# Patient Record
Sex: FEMALE | Race: WHITE | HISPANIC OR LATINO | Employment: UNEMPLOYED | URBAN - METROPOLITAN AREA
[De-identification: names, ages, dates, MRNs, and addresses within clinical notes are randomized per-mention and may not be internally consistent; named-entity substitution may affect disease eponyms.]

---

## 2021-04-07 ENCOUNTER — HOSPITAL ENCOUNTER (EMERGENCY)
Facility: HOSPITAL | Age: 26
Discharge: HOME | End: 2021-04-07
Attending: EMERGENCY MEDICINE

## 2021-04-07 VITALS
SYSTOLIC BLOOD PRESSURE: 108 MMHG | BODY MASS INDEX: 18.84 KG/M2 | RESPIRATION RATE: 16 BRPM | WEIGHT: 113.19 LBS | OXYGEN SATURATION: 99 % | TEMPERATURE: 97 F | DIASTOLIC BLOOD PRESSURE: 65 MMHG | HEART RATE: 75 BPM

## 2021-04-07 DIAGNOSIS — R53.83 FATIGUE, UNSPECIFIED TYPE: ICD-10-CM

## 2021-04-07 DIAGNOSIS — Z34.90 INTRAUTERINE PREGNANCY: Primary | ICD-10-CM

## 2021-04-07 DIAGNOSIS — R00.2 PALPITATIONS: ICD-10-CM

## 2021-04-07 LAB
ALBUMIN SERPL BCP-MCNC: 3.8 G/DL (ref 3.5–5.2)
ALP SERPL-CCNC: 51 U/L (ref 55–135)
ALT SERPL W/O P-5'-P-CCNC: 12 U/L (ref 10–44)
ANION GAP SERPL CALC-SCNC: 7 MMOL/L (ref 8–16)
AST SERPL-CCNC: 12 U/L (ref 10–40)
B-HCG UR QL: POSITIVE
BASOPHILS # BLD AUTO: 0.02 K/UL (ref 0–0.2)
BASOPHILS NFR BLD: 0.3 % (ref 0–1.9)
BILIRUB SERPL-MCNC: 0.9 MG/DL (ref 0.1–1)
BILIRUB UR QL STRIP: NEGATIVE
BUN SERPL-MCNC: 10 MG/DL (ref 6–20)
CALCIUM SERPL-MCNC: 8.7 MG/DL (ref 8.7–10.5)
CHLORIDE SERPL-SCNC: 105 MMOL/L (ref 95–110)
CLARITY UR: CLEAR
CO2 SERPL-SCNC: 24 MMOL/L (ref 23–29)
COLOR UR: YELLOW
CREAT SERPL-MCNC: 0.7 MG/DL (ref 0.5–1.4)
DIFFERENTIAL METHOD: ABNORMAL
EOSINOPHIL # BLD AUTO: 0.1 K/UL (ref 0–0.5)
EOSINOPHIL NFR BLD: 0.8 % (ref 0–8)
ERYTHROCYTE [DISTWIDTH] IN BLOOD BY AUTOMATED COUNT: 15.1 % (ref 11.5–14.5)
EST. GFR  (AFRICAN AMERICAN): >60 ML/MIN/1.73 M^2
EST. GFR  (NON AFRICAN AMERICAN): >60 ML/MIN/1.73 M^2
GLUCOSE SERPL-MCNC: 110 MG/DL (ref 70–110)
GLUCOSE UR QL STRIP: NEGATIVE
HCG INTACT+B SERPL-ACNC: NORMAL MIU/ML
HCT VFR BLD AUTO: 33.3 % (ref 37–48.5)
HGB BLD-MCNC: 10.8 G/DL (ref 12–16)
HGB UR QL STRIP: NEGATIVE
IMM GRANULOCYTES # BLD AUTO: 0.01 K/UL (ref 0–0.04)
IMM GRANULOCYTES NFR BLD AUTO: 0.2 % (ref 0–0.5)
KETONES UR QL STRIP: NEGATIVE
LEUKOCYTE ESTERASE UR QL STRIP: NEGATIVE
LYMPHOCYTES # BLD AUTO: 1.3 K/UL (ref 1–4.8)
LYMPHOCYTES NFR BLD: 21 % (ref 18–48)
MCH RBC QN AUTO: 28.3 PG (ref 27–31)
MCHC RBC AUTO-ENTMCNC: 32.4 G/DL (ref 32–36)
MCV RBC AUTO: 87 FL (ref 82–98)
MONOCYTES # BLD AUTO: 0.4 K/UL (ref 0.3–1)
MONOCYTES NFR BLD: 6.4 % (ref 4–15)
NEUTROPHILS # BLD AUTO: 4.3 K/UL (ref 1.8–7.7)
NEUTROPHILS NFR BLD: 71.3 % (ref 38–73)
NITRITE UR QL STRIP: NEGATIVE
NRBC BLD-RTO: 0 /100 WBC
PH UR STRIP: 7 [PH] (ref 5–8)
PLATELET # BLD AUTO: 199 K/UL (ref 150–450)
PMV BLD AUTO: 10.4 FL (ref 9.2–12.9)
POTASSIUM SERPL-SCNC: 3.6 MMOL/L (ref 3.5–5.1)
PROT SERPL-MCNC: 6.4 G/DL (ref 6–8.4)
PROT UR QL STRIP: NEGATIVE
RBC # BLD AUTO: 3.82 M/UL (ref 4–5.4)
SODIUM SERPL-SCNC: 136 MMOL/L (ref 136–145)
SP GR UR STRIP: 1.01 (ref 1–1.03)
T4 FREE SERPL-MCNC: 1.2 NG/DL (ref 0.71–1.51)
TROPONIN I SERPL DL<=0.01 NG/ML-MCNC: <0.006 NG/ML (ref 0–0.03)
TSH SERPL DL<=0.005 MIU/L-ACNC: 0.34 UIU/ML (ref 0.4–4)
URN SPEC COLLECT METH UR: NORMAL
UROBILINOGEN UR STRIP-ACNC: NEGATIVE EU/DL
WBC # BLD AUTO: 6.09 K/UL (ref 3.9–12.7)

## 2021-04-07 PROCEDURE — 93010 ELECTROCARDIOGRAM REPORT: CPT | Mod: ,,, | Performed by: INTERNAL MEDICINE

## 2021-04-07 PROCEDURE — 25000003 PHARM REV CODE 250: Performed by: NURSE PRACTITIONER

## 2021-04-07 PROCEDURE — 84439 ASSAY OF FREE THYROXINE: CPT | Performed by: NURSE PRACTITIONER

## 2021-04-07 PROCEDURE — 81025 URINE PREGNANCY TEST: CPT | Performed by: NURSE PRACTITIONER

## 2021-04-07 PROCEDURE — 84443 ASSAY THYROID STIM HORMONE: CPT | Performed by: NURSE PRACTITIONER

## 2021-04-07 PROCEDURE — 96360 HYDRATION IV INFUSION INIT: CPT

## 2021-04-07 PROCEDURE — 93010 EKG 12-LEAD: ICD-10-PCS | Mod: ,,, | Performed by: INTERNAL MEDICINE

## 2021-04-07 PROCEDURE — 99285 EMERGENCY DEPT VISIT HI MDM: CPT | Mod: 25

## 2021-04-07 PROCEDURE — 36415 COLL VENOUS BLD VENIPUNCTURE: CPT | Performed by: NURSE PRACTITIONER

## 2021-04-07 PROCEDURE — 81003 URINALYSIS AUTO W/O SCOPE: CPT | Performed by: NURSE PRACTITIONER

## 2021-04-07 PROCEDURE — 84484 ASSAY OF TROPONIN QUANT: CPT | Performed by: NURSE PRACTITIONER

## 2021-04-07 PROCEDURE — 84702 CHORIONIC GONADOTROPIN TEST: CPT | Performed by: NURSE PRACTITIONER

## 2021-04-07 PROCEDURE — 80053 COMPREHEN METABOLIC PANEL: CPT | Performed by: NURSE PRACTITIONER

## 2021-04-07 PROCEDURE — 85025 COMPLETE CBC W/AUTO DIFF WBC: CPT | Performed by: NURSE PRACTITIONER

## 2021-04-07 PROCEDURE — 93005 ELECTROCARDIOGRAM TRACING: CPT

## 2021-04-07 RX ORDER — SODIUM CHLORIDE 9 MG/ML
INJECTION, SOLUTION INTRAVENOUS
Status: COMPLETED | OUTPATIENT
Start: 2021-04-07 | End: 2021-04-07

## 2021-04-07 RX ADMIN — SODIUM CHLORIDE: 0.9 INJECTION, SOLUTION INTRAVENOUS at 01:04

## 2024-07-28 ENCOUNTER — HOSPITAL ENCOUNTER (EMERGENCY)
Facility: HOSPITAL | Age: 29
Discharge: HOME | End: 2024-07-28
Attending: SURGERY

## 2024-07-28 VITALS
TEMPERATURE: 98 F | RESPIRATION RATE: 20 BRPM | HEIGHT: 65 IN | WEIGHT: 115 LBS | OXYGEN SATURATION: 98 % | HEART RATE: 60 BPM | BODY MASS INDEX: 19.16 KG/M2 | SYSTOLIC BLOOD PRESSURE: 122 MMHG | DIASTOLIC BLOOD PRESSURE: 71 MMHG

## 2024-07-28 DIAGNOSIS — R07.89 CHEST TIGHTNESS: ICD-10-CM

## 2024-07-28 DIAGNOSIS — A08.4 VIRAL GASTROENTERITIS: Primary | ICD-10-CM

## 2024-07-28 DIAGNOSIS — E87.6 HYPOKALEMIA: ICD-10-CM

## 2024-07-28 LAB
ALBUMIN SERPL BCP-MCNC: 4.7 G/DL (ref 3.5–5.2)
ALP SERPL-CCNC: 84 U/L (ref 55–135)
ALT SERPL W/O P-5'-P-CCNC: 20 U/L (ref 10–44)
ANION GAP SERPL CALC-SCNC: 16 MMOL/L (ref 8–16)
AST SERPL-CCNC: 21 U/L (ref 10–40)
B-HCG UR QL: NEGATIVE
BACTERIA #/AREA URNS HPF: ABNORMAL /HPF
BASOPHILS # BLD AUTO: 0.01 K/UL (ref 0–0.2)
BASOPHILS NFR BLD: 0.1 % (ref 0–1.9)
BILIRUB SERPL-MCNC: 1.2 MG/DL (ref 0.1–1)
BILIRUB UR QL STRIP: ABNORMAL
BUN SERPL-MCNC: 12 MG/DL (ref 6–20)
CALCIUM SERPL-MCNC: 10.2 MG/DL (ref 8.7–10.5)
CHLORIDE SERPL-SCNC: 105 MMOL/L (ref 95–110)
CLARITY UR: CLEAR
CO2 SERPL-SCNC: 18 MMOL/L (ref 23–29)
COLOR UR: YELLOW
CREAT SERPL-MCNC: 0.8 MG/DL (ref 0.5–1.4)
DIFFERENTIAL METHOD BLD: ABNORMAL
EOSINOPHIL # BLD AUTO: 0 K/UL (ref 0–0.5)
EOSINOPHIL NFR BLD: 0.3 % (ref 0–8)
ERYTHROCYTE [DISTWIDTH] IN BLOOD BY AUTOMATED COUNT: 12.6 % (ref 11.5–14.5)
EST. GFR  (NO RACE VARIABLE): >60 ML/MIN/1.73 M^2
GLUCOSE SERPL-MCNC: 109 MG/DL (ref 70–110)
GLUCOSE UR QL STRIP: NEGATIVE
HCT VFR BLD AUTO: 40.8 % (ref 37–48.5)
HGB BLD-MCNC: 13.8 G/DL (ref 12–16)
HGB UR QL STRIP: ABNORMAL
HYALINE CASTS #/AREA URNS LPF: 0 /LPF
IMM GRANULOCYTES # BLD AUTO: 0.02 K/UL (ref 0–0.04)
IMM GRANULOCYTES NFR BLD AUTO: 0.3 % (ref 0–0.5)
KETONES UR QL STRIP: ABNORMAL
LEUKOCYTE ESTERASE UR QL STRIP: NEGATIVE
LIPASE SERPL-CCNC: 19 U/L (ref 4–60)
LYMPHOCYTES # BLD AUTO: 0.8 K/UL (ref 1–4.8)
LYMPHOCYTES NFR BLD: 10.2 % (ref 18–48)
MCH RBC QN AUTO: 29 PG (ref 27–31)
MCHC RBC AUTO-ENTMCNC: 33.8 G/DL (ref 32–36)
MCV RBC AUTO: 86 FL (ref 82–98)
MICROSCOPIC COMMENT: ABNORMAL
MONOCYTES # BLD AUTO: 0.4 K/UL (ref 0.3–1)
MONOCYTES NFR BLD: 5.4 % (ref 4–15)
NEUTROPHILS # BLD AUTO: 6.5 K/UL (ref 1.8–7.7)
NEUTROPHILS NFR BLD: 83.7 % (ref 38–73)
NITRITE UR QL STRIP: NEGATIVE
NRBC BLD-RTO: 0 /100 WBC
PH UR STRIP: 7 [PH] (ref 5–8)
PLATELET # BLD AUTO: 215 K/UL (ref 150–450)
PMV BLD AUTO: 10.7 FL (ref 9.2–12.9)
POTASSIUM SERPL-SCNC: 3.4 MMOL/L (ref 3.5–5.1)
PROT SERPL-MCNC: 8.1 G/DL (ref 6–8.4)
PROT UR QL STRIP: ABNORMAL
RBC # BLD AUTO: 4.76 M/UL (ref 4–5.4)
RBC #/AREA URNS HPF: 12 /HPF (ref 0–4)
SODIUM SERPL-SCNC: 139 MMOL/L (ref 136–145)
SP GR UR STRIP: 1.02 (ref 1–1.03)
TROPONIN I SERPL DL<=0.01 NG/ML-MCNC: <0.006 NG/ML (ref 0–0.03)
URN SPEC COLLECT METH UR: ABNORMAL
UROBILINOGEN UR STRIP-ACNC: NEGATIVE EU/DL
WBC # BLD AUTO: 7.78 K/UL (ref 3.9–12.7)
WBC #/AREA URNS HPF: 0 /HPF (ref 0–5)

## 2024-07-28 PROCEDURE — 63600175 PHARM REV CODE 636 W HCPCS

## 2024-07-28 PROCEDURE — 99284 EMERGENCY DEPT VISIT MOD MDM: CPT | Mod: 25

## 2024-07-28 PROCEDURE — 83690 ASSAY OF LIPASE: CPT

## 2024-07-28 PROCEDURE — 80053 COMPREHEN METABOLIC PANEL: CPT

## 2024-07-28 PROCEDURE — 96375 TX/PRO/DX INJ NEW DRUG ADDON: CPT

## 2024-07-28 PROCEDURE — 96361 HYDRATE IV INFUSION ADD-ON: CPT

## 2024-07-28 PROCEDURE — 84484 ASSAY OF TROPONIN QUANT: CPT

## 2024-07-28 PROCEDURE — 93005 ELECTROCARDIOGRAM TRACING: CPT

## 2024-07-28 PROCEDURE — 85025 COMPLETE CBC W/AUTO DIFF WBC: CPT

## 2024-07-28 PROCEDURE — 81000 URINALYSIS NONAUTO W/SCOPE: CPT

## 2024-07-28 PROCEDURE — 96365 THER/PROPH/DIAG IV INF INIT: CPT

## 2024-07-28 PROCEDURE — 96376 TX/PRO/DX INJ SAME DRUG ADON: CPT

## 2024-07-28 PROCEDURE — 25000003 PHARM REV CODE 250

## 2024-07-28 PROCEDURE — 93010 ELECTROCARDIOGRAM REPORT: CPT | Mod: ,,, | Performed by: INTERNAL MEDICINE

## 2024-07-28 PROCEDURE — 81025 URINE PREGNANCY TEST: CPT

## 2024-07-28 RX ORDER — PROMETHAZINE HYDROCHLORIDE 25 MG/1
25 TABLET ORAL EVERY 6 HOURS PRN
Qty: 10 TABLET | Refills: 0 | OUTPATIENT
Start: 2024-07-28 | End: 2024-07-29

## 2024-07-28 RX ORDER — PANTOPRAZOLE SODIUM 40 MG/10ML
40 INJECTION, POWDER, LYOPHILIZED, FOR SOLUTION INTRAVENOUS
Status: COMPLETED | OUTPATIENT
Start: 2024-07-28 | End: 2024-07-28

## 2024-07-28 RX ORDER — ONDANSETRON HYDROCHLORIDE 2 MG/ML
4 INJECTION, SOLUTION INTRAVENOUS
Status: COMPLETED | OUTPATIENT
Start: 2024-07-28 | End: 2024-07-28

## 2024-07-28 RX ORDER — ONDANSETRON 4 MG/1
4 TABLET, ORALLY DISINTEGRATING ORAL EVERY 8 HOURS PRN
Qty: 15 TABLET | Refills: 0 | Status: SHIPPED | OUTPATIENT
Start: 2024-07-28

## 2024-07-28 RX ORDER — DICYCLOMINE HYDROCHLORIDE 20 MG/1
20 TABLET ORAL 2 TIMES DAILY PRN
Qty: 10 TABLET | Refills: 0 | Status: SHIPPED | OUTPATIENT
Start: 2024-07-28 | End: 2024-08-02

## 2024-07-28 RX ORDER — LORAZEPAM 2 MG/ML
1 INJECTION INTRAMUSCULAR
Status: COMPLETED | OUTPATIENT
Start: 2024-07-28 | End: 2024-07-28

## 2024-07-28 RX ADMIN — PROMETHAZINE HYDROCHLORIDE 25 MG: 25 INJECTION INTRAMUSCULAR; INTRAVENOUS at 11:07

## 2024-07-28 RX ADMIN — SODIUM CHLORIDE 1000 ML: 9 INJECTION, SOLUTION INTRAVENOUS at 11:07

## 2024-07-28 RX ADMIN — ONDANSETRON 4 MG: 2 INJECTION, SOLUTION INTRAMUSCULAR; INTRAVENOUS at 01:07

## 2024-07-28 RX ADMIN — PANTOPRAZOLE SODIUM 40 MG: 40 INJECTION, POWDER, FOR SOLUTION INTRAVENOUS at 11:07

## 2024-07-28 RX ADMIN — LORAZEPAM 1 MG: 2 INJECTION INTRAMUSCULAR at 01:07

## 2024-07-28 RX ADMIN — LORAZEPAM 1 MG: 2 INJECTION INTRAMUSCULAR; INTRAVENOUS at 12:07

## 2024-07-28 NOTE — DISCHARGE INSTRUCTIONS
Please take Bentyl to help control stomach cramping.  Please use Zofran and Phenergan as needed to help control nausea vomiting.  You may return to ER if you develop any worsening or concerning symptoms

## 2024-07-28 NOTE — ED PROVIDER NOTES
Encounter Date: 7/28/2024       History     Chief Complaint   Patient presents with    Vomiting    Diarrhea     This note is dictated on M*Modal word recognition program.  There are word recognition mistakes and grammatical errors that are occasionally missed on review.     Laila Pagan is a 29 y.o. female presents to ER today with complaints of lower abdominal cramping, vomiting and diarrhea since yesterday.  Patient reports chest tightness and worsening chest pain as she vomits.  Patient reports she does have a history of colitis in the past.  Patient reports the vomiting and chest pressure is making her very anxious at this time.  The more she vomits to heavy her chest gets.  Patient reports the feeling she was having make her very anxious at this time.  She feels like she is on the verge of a panic attack.    The history is provided by the patient.     Review of patient's allergies indicates:  No Known Allergies  No past medical history on file.  No past surgical history on file.  Family History   Problem Relation Name Age of Onset    Breast cancer Neg Hx      Colon cancer Neg Hx      Ovarian cancer Neg Hx       Social History     Tobacco Use    Smoking status: Former    Smokeless tobacco: Never   Substance Use Topics    Alcohol use: No    Drug use: No     Review of Systems   Respiratory:  Positive for chest tightness.    Gastrointestinal:  Positive for abdominal pain, diarrhea, nausea and vomiting.   Endocrine: Negative.    Musculoskeletal: Negative.    All other systems reviewed and are negative.      Physical Exam     Initial Vitals [07/28/24 1105]   BP Pulse Resp Temp SpO2   126/68 80 18 97.9 °F (36.6 °C) 100 %      MAP       --         Physical Exam    Constitutional: She appears well-developed and well-nourished. She is not diaphoretic.   HENT:   Head: Normocephalic.   Eyes: Pupils are equal, round, and reactive to light.   Neck: Neck supple.   Normal range of motion.  Cardiovascular:  Normal rate.            Abdominal: She exhibits no distension. There is no abdominal tenderness.   Patient is actively vomiting bile during interview. There is no rebound.   Musculoskeletal:         General: No tenderness or edema.      Cervical back: Normal range of motion and neck supple.     Neurological: She is alert and oriented to person, place, and time. GCS score is 15. GCS eye subscore is 4. GCS verbal subscore is 5. GCS motor subscore is 6.   Skin: Capillary refill takes less than 2 seconds.   Psychiatric: She has a normal mood and affect.         ED Course   Procedures  Labs Reviewed   CBC W/ AUTO DIFFERENTIAL - Abnormal       Result Value    WBC 7.78      RBC 4.76      Hemoglobin 13.8      Hematocrit 40.8      MCV 86      MCH 29.0      MCHC 33.8      RDW 12.6      Platelets 215      MPV 10.7      Immature Granulocytes 0.3      Gran # (ANC) 6.5      Immature Grans (Abs) 0.02      Lymph # 0.8 (*)     Mono # 0.4      Eos # 0.0      Baso # 0.01      nRBC 0      Gran % 83.7 (*)     Lymph % 10.2 (*)     Mono % 5.4      Eosinophil % 0.3      Basophil % 0.1      Differential Method Automated     COMPREHENSIVE METABOLIC PANEL - Abnormal    Sodium 139      Potassium 3.4 (*)     Chloride 105      CO2 18 (*)     Glucose 109      BUN 12      Creatinine 0.8      Calcium 10.2      Total Protein 8.1      Albumin 4.7      Total Bilirubin 1.2 (*)     Alkaline Phosphatase 84      AST 21      ALT 20      eGFR >60      Anion Gap 16     URINALYSIS, REFLEX TO URINE CULTURE - Abnormal    Specimen UA Urine, Clean Catch      Color, UA Yellow      Appearance, UA Clear      pH, UA 7.0      Specific Gravity, UA 1.025      Protein, UA 1+ (*)     Glucose, UA Negative      Ketones, UA 3+ (*)     Bilirubin (UA) 1+ (*)     Occult Blood UA 2+ (*)     Nitrite, UA Negative      Urobilinogen, UA Negative      Leukocytes, UA Negative      Narrative:     Specimen Source->Urine   URINALYSIS MICROSCOPIC - Abnormal    RBC, UA 12 (*)     WBC, UA 0      Bacteria Few  (*)     Hyaline Casts, UA 0      Microscopic Comment SEE COMMENT      Narrative:     Specimen Source->Urine   LIPASE    Lipase 19     PREGNANCY TEST, URINE RAPID    Preg Test, Ur Negative      Narrative:     Specimen Source->Urine   TROPONIN I    Troponin I <0.006       EKG Readings: (Independently Interpreted)   Initial Reading: No STEMI. Rhythm: Normal Sinus Rhythm. Heart Rate: 72. Ectopy: No Ectopy. Clinical Impression: Normal Sinus Rhythm       Imaging Results    None          Medications   sodium chloride 0.9% bolus 1,000 mL 1,000 mL (0 mLs Intravenous Stopped 7/28/24 1309)   promethazine (PHENERGAN) 25 mg in 0.9% NaCl 50 mL IVPB (0 mg Intravenous Stopped 7/28/24 1220)   pantoprazole injection 40 mg (40 mg Intravenous Given 7/28/24 1159)   LORazepam injection 1 mg (1 mg Intravenous Given 7/28/24 1226)   ondansetron injection 4 mg (4 mg Intravenous Given 7/28/24 1345)   LORazepam injection 1 mg (1 mg Intravenous Given 7/28/24 1345)     Medical Decision Making  Differential diagnosis includes but not limited to colitis, diverticulitis, viral gastroenteritis, food poisoning, esophagitis GERD, bilious vomiting    Patient's physical exam/laboratory findings consistent with viral gastroenteritis.  Patient had mild hypokalemia 3.4, 3.5 is normal.  Urinalysis did have blood however patient is on her menstrual cycle.  No infection noted from urinalysis.  Urine pregnancy test negative.  CBC reveals no leukocytosis no anemia.  Lipase within normal limits.    Troponin within normal limits.    EKG reveals normal sinus rhythm.  ACS felt less likely.    Patient also has been dealing with anxiety due to her symptoms.  Patient was treated with Ativan.  Patient's nausea vomiting was treated with both Zofran and Phenergan patient was given a L of normal saline.  Vital signs stable at discharge.  Patient stable at time of discharge in no acute distress.  No life-threatening illnesses were found during ER visit today.  Patient was  instructed to follow-up with PCP or other recommended specialist within the next 48-72 hours.  Patient was instructed to return to ER immediately for any worsening or concerning symptoms.  All discharge instructions discussed with patient, and patient agrees to comply with discharge instructions given today.       Amount and/or Complexity of Data Reviewed  Labs: ordered.    Risk  Prescription drug management.                                      Clinical Impression:  Final diagnoses:  [R07.89] Chest tightness  [A08.4] Viral gastroenteritis (Primary)  [E87.6] Hypokalemia          ED Disposition Condition    Discharge Stable          ED Prescriptions       Medication Sig Dispense Start Date End Date Auth. Provider    dicyclomine (BENTYL) 20 mg tablet Take 1 tablet (20 mg total) by mouth 2 (two) times daily as needed (Abdominal cramping). 10 tablet 7/28/2024 8/2/2024 Hosea Bonner, NICOLE    ondansetron (ZOFRAN-ODT) 4 MG TbDL Take 1 tablet (4 mg total) by mouth every 8 (eight) hours as needed (nausea/vomiting). 15 tablet 7/28/2024 -- Hosea Bonner NP    promethazine (PHENERGAN) 25 MG tablet Take 1 tablet (25 mg total) by mouth every 6 (six) hours as needed for Nausea (Take as 2nd choice to control nausea vomiting if Zofran ineffective.). 10 tablet 7/28/2024 -- Hosea Bonner NP          Follow-up Information    None          Hosea Bonner NP  07/28/24 5587

## 2024-07-29 ENCOUNTER — HOSPITAL ENCOUNTER (EMERGENCY)
Facility: HOSPITAL | Age: 29
Discharge: HOME | End: 2024-07-29
Attending: FAMILY MEDICINE

## 2024-07-29 VITALS
HEIGHT: 65 IN | OXYGEN SATURATION: 99 % | HEART RATE: 56 BPM | WEIGHT: 115 LBS | SYSTOLIC BLOOD PRESSURE: 140 MMHG | TEMPERATURE: 98 F | DIASTOLIC BLOOD PRESSURE: 79 MMHG | RESPIRATION RATE: 16 BRPM | BODY MASS INDEX: 19.16 KG/M2

## 2024-07-29 DIAGNOSIS — R07.89 CHEST TIGHTNESS: ICD-10-CM

## 2024-07-29 DIAGNOSIS — R10.9 ABDOMINAL PAIN: ICD-10-CM

## 2024-07-29 DIAGNOSIS — R11.2 NAUSEA AND VOMITING, UNSPECIFIED VOMITING TYPE: Primary | ICD-10-CM

## 2024-07-29 LAB
ALBUMIN SERPL BCP-MCNC: 4.2 G/DL (ref 3.5–5.2)
ALP SERPL-CCNC: 71 U/L (ref 55–135)
ALT SERPL W/O P-5'-P-CCNC: 24 U/L (ref 10–44)
ANION GAP SERPL CALC-SCNC: 14 MMOL/L (ref 8–16)
AST SERPL-CCNC: 20 U/L (ref 10–40)
BASOPHILS # BLD AUTO: 0.01 K/UL (ref 0–0.2)
BASOPHILS NFR BLD: 0.2 % (ref 0–1.9)
BILIRUB SERPL-MCNC: 0.5 MG/DL (ref 0.1–1)
BUN SERPL-MCNC: 14 MG/DL (ref 6–20)
CALCIUM SERPL-MCNC: 9.7 MG/DL (ref 8.7–10.5)
CHLORIDE SERPL-SCNC: 106 MMOL/L (ref 95–110)
CO2 SERPL-SCNC: 20 MMOL/L (ref 23–29)
CREAT SERPL-MCNC: 0.7 MG/DL (ref 0.5–1.4)
DIFFERENTIAL METHOD BLD: ABNORMAL
EOSINOPHIL # BLD AUTO: 0 K/UL (ref 0–0.5)
EOSINOPHIL NFR BLD: 0 % (ref 0–8)
ERYTHROCYTE [DISTWIDTH] IN BLOOD BY AUTOMATED COUNT: 12.9 % (ref 11.5–14.5)
EST. GFR  (NO RACE VARIABLE): >60 ML/MIN/1.73 M^2
GLUCOSE SERPL-MCNC: 108 MG/DL (ref 70–110)
HCT VFR BLD AUTO: 38.2 % (ref 37–48.5)
HGB BLD-MCNC: 12.7 G/DL (ref 12–16)
IMM GRANULOCYTES # BLD AUTO: 0.02 K/UL (ref 0–0.04)
IMM GRANULOCYTES NFR BLD AUTO: 0.3 % (ref 0–0.5)
LYMPHOCYTES # BLD AUTO: 1.1 K/UL (ref 1–4.8)
LYMPHOCYTES NFR BLD: 17.2 % (ref 18–48)
MAGNESIUM SERPL-MCNC: 1.9 MG/DL (ref 1.6–2.6)
MCH RBC QN AUTO: 28.8 PG (ref 27–31)
MCHC RBC AUTO-ENTMCNC: 33.2 G/DL (ref 32–36)
MCV RBC AUTO: 87 FL (ref 82–98)
MONOCYTES # BLD AUTO: 0.5 K/UL (ref 0.3–1)
MONOCYTES NFR BLD: 8.2 % (ref 4–15)
NEUTROPHILS # BLD AUTO: 4.7 K/UL (ref 1.8–7.7)
NEUTROPHILS NFR BLD: 74.1 % (ref 38–73)
NRBC BLD-RTO: 0 /100 WBC
PLATELET # BLD AUTO: 217 K/UL (ref 150–450)
PMV BLD AUTO: 10.9 FL (ref 9.2–12.9)
POTASSIUM SERPL-SCNC: 3.8 MMOL/L (ref 3.5–5.1)
PROT SERPL-MCNC: 7.5 G/DL (ref 6–8.4)
RBC # BLD AUTO: 4.41 M/UL (ref 4–5.4)
SODIUM SERPL-SCNC: 140 MMOL/L (ref 136–145)
TROPONIN I SERPL DL<=0.01 NG/ML-MCNC: <0.006 NG/ML (ref 0–0.03)
WBC # BLD AUTO: 6.33 K/UL (ref 3.9–12.7)

## 2024-07-29 PROCEDURE — 96374 THER/PROPH/DIAG INJ IV PUSH: CPT

## 2024-07-29 PROCEDURE — 99285 EMERGENCY DEPT VISIT HI MDM: CPT | Mod: 25

## 2024-07-29 PROCEDURE — 63600175 PHARM REV CODE 636 W HCPCS: Performed by: NURSE PRACTITIONER

## 2024-07-29 PROCEDURE — 99900035 HC TECH TIME PER 15 MIN (STAT)

## 2024-07-29 PROCEDURE — 96376 TX/PRO/DX INJ SAME DRUG ADON: CPT

## 2024-07-29 PROCEDURE — 96361 HYDRATE IV INFUSION ADD-ON: CPT

## 2024-07-29 PROCEDURE — 93010 ELECTROCARDIOGRAM REPORT: CPT | Mod: ,,, | Performed by: INTERNAL MEDICINE

## 2024-07-29 PROCEDURE — 83735 ASSAY OF MAGNESIUM: CPT | Performed by: NURSE PRACTITIONER

## 2024-07-29 PROCEDURE — 80053 COMPREHEN METABOLIC PANEL: CPT | Performed by: NURSE PRACTITIONER

## 2024-07-29 PROCEDURE — 84484 ASSAY OF TROPONIN QUANT: CPT | Performed by: NURSE PRACTITIONER

## 2024-07-29 PROCEDURE — 85025 COMPLETE CBC W/AUTO DIFF WBC: CPT | Performed by: NURSE PRACTITIONER

## 2024-07-29 PROCEDURE — 93005 ELECTROCARDIOGRAM TRACING: CPT

## 2024-07-29 PROCEDURE — 25000003 PHARM REV CODE 250: Performed by: NURSE PRACTITIONER

## 2024-07-29 PROCEDURE — 96375 TX/PRO/DX INJ NEW DRUG ADDON: CPT

## 2024-07-29 RX ORDER — LORAZEPAM 2 MG/ML
0.5 INJECTION INTRAMUSCULAR
Status: COMPLETED | OUTPATIENT
Start: 2024-07-29 | End: 2024-07-29

## 2024-07-29 RX ORDER — PROMETHAZINE HYDROCHLORIDE 25 MG/1
25 SUPPOSITORY RECTAL EVERY 6 HOURS PRN
Qty: 10 SUPPOSITORY | Refills: 0 | Status: SHIPPED | OUTPATIENT
Start: 2024-07-29

## 2024-07-29 RX ORDER — LORAZEPAM 0.5 MG/1
0.5 TABLET ORAL
Status: DISCONTINUED | OUTPATIENT
Start: 2024-07-29 | End: 2024-07-29

## 2024-07-29 RX ORDER — ONDANSETRON HYDROCHLORIDE 2 MG/ML
4 INJECTION, SOLUTION INTRAVENOUS
Status: COMPLETED | OUTPATIENT
Start: 2024-07-29 | End: 2024-07-29

## 2024-07-29 RX ADMIN — LORAZEPAM 0.5 MG: 2 INJECTION INTRAMUSCULAR; INTRAVENOUS at 10:07

## 2024-07-29 RX ADMIN — LORAZEPAM 0.5 MG: 2 INJECTION INTRAMUSCULAR; INTRAVENOUS at 12:07

## 2024-07-29 RX ADMIN — SODIUM CHLORIDE 1000 ML: 0.9 INJECTION, SOLUTION INTRAVENOUS at 10:07

## 2024-07-29 RX ADMIN — ONDANSETRON 4 MG: 2 INJECTION INTRAMUSCULAR; INTRAVENOUS at 10:07

## 2024-07-29 NOTE — ED PROVIDER NOTES
Encounter Date: 7/29/2024       History     Chief Complaint   Patient presents with    Vomiting     Chief complaint: Vomiting, anxiety  29-year-old female with no significant medical history presents to be evaluated for nausea and vomiting for the last 2 days.  She was seen in our ED 1 day prior with no concerning findings and was discharged for gastroenteritis.  She reports she has had 2-3 episodes of nonbilious nonbloody vomiting since being discharge.  She isn't able to hold down food or fluids.  She reports generalized abdominal cramping.  Also reports some chest tightness and anxiety.  Reports that the more she vomits more anxious she gets which is increasing her chest pain.  Denies any diarrhea constipation.  Denies any fever body aches or chills.  Denies any suspicious foods or recent ill contacts.      Review of patient's allergies indicates:  No Known Allergies  History reviewed. No pertinent past medical history.  History reviewed. No pertinent surgical history.  Family History   Problem Relation Name Age of Onset    Breast cancer Neg Hx      Colon cancer Neg Hx      Ovarian cancer Neg Hx       Social History     Tobacco Use    Smoking status: Former    Smokeless tobacco: Never   Substance Use Topics    Alcohol use: No    Drug use: No     Review of Systems   Constitutional:  Negative for fatigue and fever.   HENT:  Negative for congestion, sinus pain and sore throat.    Respiratory:  Positive for chest tightness. Negative for shortness of breath and wheezing.    Cardiovascular:  Negative for chest pain.   Gastrointestinal:  Positive for abdominal pain, nausea and vomiting. Negative for constipation and diarrhea.   Musculoskeletal:  Positive for myalgias.   Neurological:  Positive for weakness.       Physical Exam     Initial Vitals [07/29/24 0929]   BP Pulse Resp Temp SpO2   136/79 (!) 58 18 98.3 °F (36.8 °C) 99 %      MAP       --         Physical Exam    Nursing note and vitals reviewed.  Constitutional:  She appears well-developed and well-nourished.   HENT:   Head: Normocephalic and atraumatic.   Cardiovascular:  Normal rate and regular rhythm.           Pulmonary/Chest: Breath sounds normal. She has no wheezes. She has no rhonchi. She has no rales.   Abdominal: Abdomen is soft. She exhibits no distension and no mass. There is abdominal tenderness.   Epigastric tenderness There is no rebound and no guarding.           ED Course   Procedures  Labs Reviewed   CBC W/ AUTO DIFFERENTIAL - Abnormal       Result Value    WBC 6.33      RBC 4.41      Hemoglobin 12.7      Hematocrit 38.2      MCV 87      MCH 28.8      MCHC 33.2      RDW 12.9      Platelets 217      MPV 10.9      Immature Granulocytes 0.3      Gran # (ANC) 4.7      Immature Grans (Abs) 0.02      Lymph # 1.1      Mono # 0.5      Eos # 0.0      Baso # 0.01      nRBC 0      Gran % 74.1 (*)     Lymph % 17.2 (*)     Mono % 8.2      Eosinophil % 0.0      Basophil % 0.2      Differential Method Automated     COMPREHENSIVE METABOLIC PANEL - Abnormal    Sodium 140      Potassium 3.8      Chloride 106      CO2 20 (*)     Glucose 108      BUN 14      Creatinine 0.7      Calcium 9.7      Total Protein 7.5      Albumin 4.2      Total Bilirubin 0.5      Alkaline Phosphatase 71      AST 20      ALT 24      eGFR >60      Anion Gap 14     MAGNESIUM    Magnesium 1.9     TROPONIN I    Troponin I <0.006     URINALYSIS, REFLEX TO URINE CULTURE          Imaging Results              X-Ray Abdomen Flat And Erect (Final result)  Result time 07/29/24 10:50:52      Final result by Brennan Prince MD (07/29/24 10:50:52)                   Impression:      No acute process.      Electronically signed by: Brennan Prince MD  Date:    07/29/2024  Time:    10:50               Narrative:    EXAMINATION:  XR ABDOMEN FLAT AND ERECT    CLINICAL HISTORY:  Unspecified abdominal pain    TECHNIQUE:  Flat and erect AP views of the abdomen were  preformed.    COMPARISON:  08/24/2015    FINDINGS:  No dilated loops of small bowel.  No evidence of obstruction.  There are no suspicious calcifications.  IUD in within the pelvis osseous structures are within normal limits.   Lung bases are clear.                                       Medications   sodium chloride 0.9% bolus 1,000 mL 1,000 mL (0 mLs Intravenous Stopped 7/29/24 1101)   ondansetron injection 4 mg (4 mg Intravenous Given 7/29/24 1001)   LORazepam injection 0.5 mg (0.5 mg Intravenous Given 7/29/24 1022)   LORazepam injection 0.5 mg (0.5 mg Intravenous Given 7/29/24 1204)     Medical Decision Making  Th[age] year female presents to be evaluated for generalized abdominal pain and vomiting for the last 2 days.  Recently had a negative workup in our ED   Differential diagnoses include gastroenteritis, dehydration, electrolyte disturbance, anxiety    Amount and/or Complexity of Data Reviewed  Labs: ordered.     Details: CBC, CMP, troponin  Radiology: ordered.     Details: Flat and erect    Risk  Prescription drug management.  Risk Details: Patient with benign abdominal exam in ED today with no signs of acute abdomen  Patient had leukocytosis gross electrolyte derangement  Given IV fluids and antiemetics which was improving patient's symptoms  Flat and erect unremarkable   Vital signs stable   Patient is feeling somewhat improved since the incident stable for DC with follow-up with primary care   Given return precautions                                      Clinical Impression:  Final diagnoses:  [R07.89] Chest tightness  [R10.9] Abdominal pain  [R11.2] Nausea and vomiting, unspecified vomiting type (Primary)          ED Disposition Condition    Discharge Stable          ED Prescriptions       Medication Sig Dispense Start Date End Date Auth. Provider    promethazine (PHENERGAN) 25 MG suppository Place 1 suppository (25 mg total) rectally every 6 (six) hours as needed for Nausea. 10 suppository 7/29/2024 --  Ale Starkey, NICOLE          Follow-up Information    None          Ale Starkey, NICOLE  07/29/24 5589

## 2024-07-30 ENCOUNTER — HOSPITAL ENCOUNTER (EMERGENCY)
Facility: HOSPITAL | Age: 29
Discharge: HOME | End: 2024-07-30
Attending: EMERGENCY MEDICINE

## 2024-07-30 VITALS
HEART RATE: 62 BPM | BODY MASS INDEX: 17.99 KG/M2 | RESPIRATION RATE: 16 BRPM | SYSTOLIC BLOOD PRESSURE: 146 MMHG | HEIGHT: 65 IN | WEIGHT: 108 LBS | TEMPERATURE: 98 F | DIASTOLIC BLOOD PRESSURE: 82 MMHG | OXYGEN SATURATION: 100 %

## 2024-07-30 DIAGNOSIS — R10.13 EPIGASTRIC PAIN: ICD-10-CM

## 2024-07-30 DIAGNOSIS — K52.9 ENTERITIS: ICD-10-CM

## 2024-07-30 DIAGNOSIS — R11.10 VOMITING, UNSPECIFIED VOMITING TYPE, UNSPECIFIED WHETHER NAUSEA PRESENT: Primary | ICD-10-CM

## 2024-07-30 DIAGNOSIS — Z76.5 DRUG-SEEKING BEHAVIOR: ICD-10-CM

## 2024-07-30 LAB
ALBUMIN SERPL BCP-MCNC: 4.2 G/DL (ref 3.5–5.2)
ALP SERPL-CCNC: 65 U/L (ref 55–135)
ALT SERPL W/O P-5'-P-CCNC: 29 U/L (ref 10–44)
AMORPH CRY URNS QL MICRO: ABNORMAL
AMPHET+METHAMPHET UR QL: NEGATIVE
ANION GAP SERPL CALC-SCNC: 12 MMOL/L (ref 8–16)
AST SERPL-CCNC: 21 U/L (ref 10–40)
B-HCG UR QL: NEGATIVE
BACTERIA #/AREA URNS HPF: ABNORMAL /HPF
BARBITURATES UR QL SCN>200 NG/ML: NEGATIVE
BASOPHILS # BLD AUTO: 0.01 K/UL (ref 0–0.2)
BASOPHILS NFR BLD: 0.2 % (ref 0–1.9)
BENZODIAZ UR QL SCN>200 NG/ML: ABNORMAL
BILIRUB SERPL-MCNC: 0.7 MG/DL (ref 0.1–1)
BILIRUB UR QL STRIP: NEGATIVE
BUN SERPL-MCNC: 14 MG/DL (ref 6–20)
BZE UR QL SCN: NEGATIVE
CALCIUM SERPL-MCNC: 9.5 MG/DL (ref 8.7–10.5)
CANNABINOIDS UR QL SCN: ABNORMAL
CHLORIDE SERPL-SCNC: 104 MMOL/L (ref 95–110)
CLARITY UR: CLEAR
CO2 SERPL-SCNC: 22 MMOL/L (ref 23–29)
COLOR UR: YELLOW
CREAT SERPL-MCNC: 0.7 MG/DL (ref 0.5–1.4)
CREAT UR-MCNC: 64 MG/DL (ref 15–325)
DIFFERENTIAL METHOD BLD: NORMAL
EOSINOPHIL # BLD AUTO: 0 K/UL (ref 0–0.5)
EOSINOPHIL NFR BLD: 0.4 % (ref 0–8)
ERYTHROCYTE [DISTWIDTH] IN BLOOD BY AUTOMATED COUNT: 12.8 % (ref 11.5–14.5)
EST. GFR  (NO RACE VARIABLE): >60 ML/MIN/1.73 M^2
GLUCOSE SERPL-MCNC: 99 MG/DL (ref 70–110)
GLUCOSE UR QL STRIP: NEGATIVE
HCT VFR BLD AUTO: 38.5 % (ref 37–48.5)
HGB BLD-MCNC: 13.1 G/DL (ref 12–16)
HGB UR QL STRIP: ABNORMAL
HYALINE CASTS #/AREA URNS LPF: 2 /LPF
IMM GRANULOCYTES # BLD AUTO: 0.01 K/UL (ref 0–0.04)
IMM GRANULOCYTES NFR BLD AUTO: 0.2 % (ref 0–0.5)
KETONES UR QL STRIP: ABNORMAL
LEUKOCYTE ESTERASE UR QL STRIP: NEGATIVE
LIPASE SERPL-CCNC: 23 U/L (ref 4–60)
LYMPHOCYTES # BLD AUTO: 1.5 K/UL (ref 1–4.8)
LYMPHOCYTES NFR BLD: 28.4 % (ref 18–48)
MCH RBC QN AUTO: 29.8 PG (ref 27–31)
MCHC RBC AUTO-ENTMCNC: 34 G/DL (ref 32–36)
MCV RBC AUTO: 88 FL (ref 82–98)
METHADONE UR QL SCN>300 NG/ML: NEGATIVE
MICROSCOPIC COMMENT: ABNORMAL
MONOCYTES # BLD AUTO: 0.4 K/UL (ref 0.3–1)
MONOCYTES NFR BLD: 7.4 % (ref 4–15)
NEUTROPHILS # BLD AUTO: 3.3 K/UL (ref 1.8–7.7)
NEUTROPHILS NFR BLD: 63.4 % (ref 38–73)
NITRITE UR QL STRIP: NEGATIVE
NRBC BLD-RTO: 0 /100 WBC
OHS QRS DURATION: 80 MS
OHS QRS DURATION: 98 MS
OHS QTC CALCULATION: 419 MS
OHS QTC CALCULATION: 459 MS
OPIATES UR QL SCN: NEGATIVE
PCP UR QL SCN>25 NG/ML: NEGATIVE
PH UR STRIP: 7 [PH] (ref 5–8)
PLATELET # BLD AUTO: 186 K/UL (ref 150–450)
PMV BLD AUTO: 10.5 FL (ref 9.2–12.9)
POTASSIUM SERPL-SCNC: 3.3 MMOL/L (ref 3.5–5.1)
PROT SERPL-MCNC: 7.2 G/DL (ref 6–8.4)
PROT UR QL STRIP: ABNORMAL
RBC # BLD AUTO: 4.4 M/UL (ref 4–5.4)
RBC #/AREA URNS HPF: 3 /HPF (ref 0–4)
SODIUM SERPL-SCNC: 138 MMOL/L (ref 136–145)
SP GR UR STRIP: 1.01 (ref 1–1.03)
TOXICOLOGY INFORMATION: ABNORMAL
URN SPEC COLLECT METH UR: ABNORMAL
UROBILINOGEN UR STRIP-ACNC: NEGATIVE EU/DL
WBC # BLD AUTO: 5.25 K/UL (ref 3.9–12.7)
WBC #/AREA URNS HPF: 1 /HPF (ref 0–5)

## 2024-07-30 PROCEDURE — 99285 EMERGENCY DEPT VISIT HI MDM: CPT | Mod: 25

## 2024-07-30 PROCEDURE — 80053 COMPREHEN METABOLIC PANEL: CPT | Performed by: EMERGENCY MEDICINE

## 2024-07-30 PROCEDURE — 93005 ELECTROCARDIOGRAM TRACING: CPT

## 2024-07-30 PROCEDURE — 96374 THER/PROPH/DIAG INJ IV PUSH: CPT

## 2024-07-30 PROCEDURE — 85025 COMPLETE CBC W/AUTO DIFF WBC: CPT | Performed by: EMERGENCY MEDICINE

## 2024-07-30 PROCEDURE — 80307 DRUG TEST PRSMV CHEM ANLYZR: CPT | Performed by: EMERGENCY MEDICINE

## 2024-07-30 PROCEDURE — 81025 URINE PREGNANCY TEST: CPT | Performed by: EMERGENCY MEDICINE

## 2024-07-30 PROCEDURE — 93010 ELECTROCARDIOGRAM REPORT: CPT | Mod: ,,, | Performed by: INTERNAL MEDICINE

## 2024-07-30 PROCEDURE — 96375 TX/PRO/DX INJ NEW DRUG ADDON: CPT

## 2024-07-30 PROCEDURE — 81000 URINALYSIS NONAUTO W/SCOPE: CPT | Performed by: EMERGENCY MEDICINE

## 2024-07-30 PROCEDURE — 63600175 PHARM REV CODE 636 W HCPCS: Performed by: EMERGENCY MEDICINE

## 2024-07-30 PROCEDURE — 83690 ASSAY OF LIPASE: CPT | Performed by: EMERGENCY MEDICINE

## 2024-07-30 PROCEDURE — 25500020 PHARM REV CODE 255: Performed by: EMERGENCY MEDICINE

## 2024-07-30 PROCEDURE — 25000003 PHARM REV CODE 250: Performed by: EMERGENCY MEDICINE

## 2024-07-30 PROCEDURE — 96361 HYDRATE IV INFUSION ADD-ON: CPT

## 2024-07-30 RX ORDER — PROMETHAZINE HYDROCHLORIDE 12.5 MG/1
12.5 SUPPOSITORY RECTAL
Status: COMPLETED | OUTPATIENT
Start: 2024-07-30 | End: 2024-07-30

## 2024-07-30 RX ORDER — ONDANSETRON HYDROCHLORIDE 2 MG/ML
8 INJECTION, SOLUTION INTRAVENOUS
Status: DISCONTINUED | OUTPATIENT
Start: 2024-07-30 | End: 2024-07-30

## 2024-07-30 RX ORDER — HYDROXYZINE PAMOATE 25 MG/1
25 CAPSULE ORAL
Status: DISCONTINUED | OUTPATIENT
Start: 2024-07-30 | End: 2024-07-30 | Stop reason: HOSPADM

## 2024-07-30 RX ORDER — PROCHLORPERAZINE EDISYLATE 5 MG/ML
5 INJECTION INTRAMUSCULAR; INTRAVENOUS ONCE
Status: COMPLETED | OUTPATIENT
Start: 2024-07-30 | End: 2024-07-30

## 2024-07-30 RX ORDER — PANTOPRAZOLE SODIUM 40 MG/10ML
40 INJECTION, POWDER, LYOPHILIZED, FOR SOLUTION INTRAVENOUS
Status: COMPLETED | OUTPATIENT
Start: 2024-07-30 | End: 2024-07-30

## 2024-07-30 RX ORDER — ONDANSETRON HYDROCHLORIDE 2 MG/ML
8 INJECTION, SOLUTION INTRAVENOUS
Status: COMPLETED | OUTPATIENT
Start: 2024-07-30 | End: 2024-07-30

## 2024-07-30 RX ADMIN — PROMETHAZINE HYDROCHLORIDE 12.5 MG: 12.5 SUPPOSITORY RECTAL at 11:07

## 2024-07-30 RX ADMIN — SODIUM CHLORIDE 1000 ML: 0.9 INJECTION, SOLUTION INTRAVENOUS at 11:07

## 2024-07-30 RX ADMIN — SODIUM CHLORIDE 1000 ML: 0.9 INJECTION, SOLUTION INTRAVENOUS at 08:07

## 2024-07-30 RX ADMIN — PROCHLORPERAZINE EDISYLATE 5 MG: 5 INJECTION INTRAMUSCULAR; INTRAVENOUS at 11:07

## 2024-07-30 RX ADMIN — ONDANSETRON 8 MG: 2 INJECTION INTRAMUSCULAR; INTRAVENOUS at 08:07

## 2024-07-30 RX ADMIN — PANTOPRAZOLE SODIUM 40 MG: 40 INJECTION, POWDER, FOR SOLUTION INTRAVENOUS at 10:07

## 2024-07-30 RX ADMIN — IOHEXOL 75 ML: 350 INJECTION, SOLUTION INTRAVENOUS at 10:07

## 2024-07-30 NOTE — ED PROVIDER NOTES
Encounter Date: 7/30/2024       History     Chief Complaint   Patient presents with    Vomiting     HPI  Patient is a 29y WF presenting with nausea, vomiting, epigastric pain for the past 3 days.  Vomitus is clear, non bloody.  Last bowel movement was 2 days ago and normal.  Pain is cramping, burning, midline and does not radiate.  Has been evaluated twice in the ED in the past 2 days for the same complaint. Denies urinary or vaginal complaints.     Review of patient's allergies indicates:  No Known Allergies  History reviewed. No pertinent past medical history.  History reviewed. No pertinent surgical history.  Family History   Problem Relation Name Age of Onset    Breast cancer Neg Hx      Colon cancer Neg Hx      Ovarian cancer Neg Hx       Social History     Tobacco Use    Smoking status: Former    Smokeless tobacco: Never   Substance Use Topics    Alcohol use: No    Drug use: No     Review of Systems   Constitutional:  Negative for chills and fever.   Respiratory:  Negative for cough.    Cardiovascular:  Negative for chest pain.   Gastrointestinal:  Positive for abdominal pain, nausea and vomiting.   Musculoskeletal:  Negative for back pain.   Skin:  Negative for wound.   All other systems reviewed and are negative.    Social Determinants of Health     Tobacco Use: Medium Risk (7/30/2024)    Patient History     Smoking Tobacco Use: Former     Smokeless Tobacco Use: Never     Passive Exposure: Not on file   Alcohol Use: Not on file   Financial Resource Strain: Not on file   Food Insecurity: Not on file   Transportation Needs: Not on file   Physical Activity: Not on file   Stress: Not on file   Housing Stability: Not on file   Depression: Not on file   Utilities: Not on file   Health Literacy: Not on file   Social Isolation: Not on file       Physical Exam     Initial Vitals [07/30/24 0823]   BP Pulse Resp Temp SpO2   (!) 146/82 62 16 98 °F (36.7 °C) 100 %      MAP       --         Physical Exam    Nursing note  and vitals reviewed.  Constitutional: She appears well-developed and well-nourished.   HENT:   Head: Normocephalic.   Mouth/Throat: Oropharynx is clear and moist.   Eyes: EOM are normal. Pupils are equal, round, and reactive to light.   Neck: No JVD present.   Normal range of motion.  Cardiovascular:  Normal rate and normal heart sounds.           Pulmonary/Chest: Breath sounds normal. No stridor.   Abdominal: Abdomen is soft.   Musculoskeletal:      Cervical back: Normal range of motion.     Neurological: She is alert and oriented to person, place, and time. GCS score is 15. GCS eye subscore is 4. GCS verbal subscore is 5. GCS motor subscore is 6.   Skin: Capillary refill takes less than 2 seconds.         ED Course   Procedures  Labs Reviewed   COMPREHENSIVE METABOLIC PANEL - Abnormal       Result Value    Sodium 138      Potassium 3.3 (*)     Chloride 104      CO2 22 (*)     Glucose 99      BUN 14      Creatinine 0.7      Calcium 9.5      Total Protein 7.2      Albumin 4.2      Total Bilirubin 0.7      Alkaline Phosphatase 65      AST 21      ALT 29      eGFR >60      Anion Gap 12     DRUG SCREEN PANEL, URINE EMERGENCY - Abnormal    Benzodiazepines Presumptive Positive (*)     Methadone metabolites Negative      Cocaine (Metab.) Negative      Opiate Scrn, Ur Negative      Barbiturate Screen, Ur Negative      Amphetamine Screen, Ur Negative      THC Presumptive Positive (*)     Phencyclidine Negative      Creatinine, Urine 64.0      Toxicology Information SEE COMMENT      Narrative:     Specimen Source->Urine   CBC W/ AUTO DIFFERENTIAL    WBC 5.25      RBC 4.40      Hemoglobin 13.1      Hematocrit 38.5      MCV 88      MCH 29.8      MCHC 34.0      RDW 12.8      Platelets 186      MPV 10.5      Immature Granulocytes 0.2      Gran # (ANC) 3.3      Immature Grans (Abs) 0.01      Lymph # 1.5      Mono # 0.4      Eos # 0.0      Baso # 0.01      nRBC 0      Gran % 63.4      Lymph % 28.4      Mono % 7.4      Eosinophil %  0.4      Basophil % 0.2      Differential Method Automated     LIPASE    Lipase 23     PREGNANCY TEST, URINE RAPID    Preg Test, Ur Negative      Narrative:     Specimen Source->Urine   LIPASE   URINALYSIS, REFLEX TO URINE CULTURE   URINALYSIS MICROSCOPIC     EKG Readings: (Independently Interpreted)   Initial Reading: No STEMI. Rhythm: Sinus Bradycardia. Heart Rate: 54. Ectopy: No Ectopy. Conduction: Normal. ST Segments: Normal ST Segments. T Waves: Normal. Axis: Normal.       Imaging Results              CT Abdomen Pelvis With IV Contrast NO Oral Contrast (Final result)  Result time 07/30/24 10:36:26      Final result by June Warren MD (07/30/24 10:36:26)                   Impression:      Thickened loops of small bowel seen in the left upper quadrant suggesting an enteritis.  No free air or obstruction.    An intrauterine device is present.  Prominent vasculature about the uterus and adnexa suggesting pelvic vascular congestion syndrome.      Electronically signed by: June Warren MD  Date:    07/30/2024  Time:    10:36               Narrative:    EXAMINATION:  CT ABDOMEN PELVIS WITH IV CONTRAST    CLINICAL HISTORY:  epigastric pain;    TECHNIQUE:  Low dose axial images, sagittal and coronal reformations were obtained from the lung bases to the pubic symphysis following the IV administration of 75 mL of Omnipaque 350 .  Oral contrast was not given.    COMPARISON:  None.    FINDINGS:  The lung bases are clear.  The base of the heart appears normal.  The aorta is of normal caliber and tapers appropriately.    No radiopaque gallstones are seen.  No intrahepatic or extrahepatic biliary ductal dilatation is identified.  The liver, spleen, pancreas, adrenal glands and kidneys are normal in size, shape and contour.  No hydronephrosis or hydroureter.  Urinary bladder is well distended and appears normal.  An intrauterine device is present in the uterus.  Adnexal regions appear normal.  There is prominent  "vascularity about the uterus and adnexa suggesting pelvic vascular congestion syndrome.    There is free fluid in the pelvis which could be physiologic.  Thickened loops of small bowel seen in the left upper quadrant suggestive for an enteritis.  No free air or obstruction.  No pathologically enlarged abdominal or pelvic lymph nodes are seen.    Skeletal structures are intact.                                       Medications   hydrOXYzine pamoate capsule 25 mg (25 mg Oral Not Given 7/30/24 1100)   sodium chloride 0.9% bolus 1,000 mL 1,000 mL (1,000 mLs Intravenous New Bag 7/30/24 1122)   sodium chloride 0.9% bolus 1,000 mL 1,000 mL (0 mLs Intravenous Stopped 7/30/24 0944)   ondansetron injection 8 mg (8 mg Intravenous Given 7/30/24 0857)   iohexoL (OMNIPAQUE 350) injection 75 mL (75 mLs Intravenous Given 7/30/24 1024)   prochlorperazine injection Soln 5 mg (5 mg Intravenous Not Given 7/30/24 1145)   pantoprazole injection 40 mg (40 mg Intravenous Given 7/30/24 1058)   promethazine suppository 12.5 mg (12.5 mg Rectal Given 7/30/24 1115)     Medical Decision Making  This is an emergent evaluation of a 29y WF presenting with nausea and vomiting for 3 days. Exam she is non toxic, afebrile with diffuse abdominal tenderness without rebound or guarding.  Labs remarkable for potassium 3.3, no leukocytosis or anemia. UA without infection.  Tox screen + benzos and THC.      Patient states " ativan is the only drug that works, they gave it to me last time." Discussed with her other methods that are non habit forming for controlling her anxiety.  She refused. Patient has not vomited during her ER stay.  Additional IVF bolus given.  Referred to GI.    Instructed to  phenergan prescribed on prior ER visit.    DDX: hyperemesis cannabis, electrolyte abnormality, metabolic derangement, bowel obstruction       Amount and/or Complexity of Data Reviewed  Labs: ordered.  Radiology: ordered.    Risk  Prescription drug " management.                                      Clinical Impression:  Final diagnoses:  [R10.13] Epigastric pain  [R11.10] Vomiting, unspecified vomiting type, unspecified whether nausea present (Primary)  [K52.9] Enteritis  [Z76.5] Drug-seeking behavior          ED Disposition Condition    Discharge Stable          ED Prescriptions    None       Follow-up Information       Follow up With Specialties Details Why Contact Info    Ric Fagan Jr., MD Gastroenterology Schedule an appointment as soon as possible for a visit in 1 day for evaluation by a specialist [address] [place], [place] [phone]      Akhil Meehan MD Gastroenterology Schedule an appointment as soon as possible for a visit in 1 day for evaluation by a specialist [address] [place] [phone]               Leah Godinez MD  07/30/24 9242

## 2024-08-01 LAB
OHS QRS DURATION: 86 MS
OHS QTC CALCULATION: 426 MS